# Patient Record
Sex: MALE | NOT HISPANIC OR LATINO | URBAN - METROPOLITAN AREA
[De-identification: names, ages, dates, MRNs, and addresses within clinical notes are randomized per-mention and may not be internally consistent; named-entity substitution may affect disease eponyms.]

---

## 2024-07-11 ENCOUNTER — TELEPHONE (OUTPATIENT)
Dept: CARDIOLOGY | Facility: CLINIC | Age: 79
End: 2024-07-11

## 2024-07-11 NOTE — TELEPHONE ENCOUNTER
Patient's daughter dropped off paperwork for this patient from the [facility]. It is a Heart Conditions Disability Benefits Questionnaire. It is requesting a lot of information and the patient has not been seen by you since [email or web address] I am assuming you want him to come in for an appointment so we can get more up to date information for the paperwork. I think it is another one for [employer].